# Patient Record
Sex: FEMALE | Race: WHITE | NOT HISPANIC OR LATINO | Employment: OTHER | URBAN - METROPOLITAN AREA
[De-identification: names, ages, dates, MRNs, and addresses within clinical notes are randomized per-mention and may not be internally consistent; named-entity substitution may affect disease eponyms.]

---

## 2021-05-24 NOTE — PATIENT DISCUSSION
CONJUNCTIVAL NEVUS: PT STATES THAT IT IS INCREASING IN SIZE. SECOND ONE DEVELOPING. REFER TO DR. Latricia Vu IN THE NEXT 3-4 WEEKS FOR CONSULTATION &amp; POSSIBLE BIOPSY.

## 2021-07-01 NOTE — PATIENT DISCUSSION
Documented nevus with photos. 6:00 area OD. Two areas of pigment OS. Increase UV protection. Discussed possible removal with pathology, patient was given the option. Patient states it has been increasing in size, she is interested to proceed with removal of large area of pigment OS. Should she decide not to proceed, return in 6 months for documentation of growth. Patient would like to wait till the end of year for excision of lesion of conjunctiva.  Return in October for pre-op paperwork and eval.

## 2021-07-01 NOTE — PATIENT DISCUSSION
Documented nevus with photos. 6:00 area OD. Two areas of pigment OS. Increase UV protection. Discussed possible removal with pathology, patient was given the option. Patient states it has been increasing in size, she is interested to proceed with removal of large area of pigment OS.

## 2021-07-01 NOTE — PATIENT DISCUSSION
Patient would like to wait till the end of year for excision of lesion of conjunctiva.  Return in October for pre-op paperwork and eval.

## 2021-10-14 NOTE — PATIENT DISCUSSION
Increase UV protection. Discussed possible removal with pathology, patient was given the option. Patient states it has been increasing in size, she is interested to proceed with removal of large area of pigment OS. She would like to wait until after the first of the year.

## 2021-10-14 NOTE — PATIENT DISCUSSION
Documented nevus with photos at previous visit, not saved in chart. Unable to find original images. Redocumented today for baseline images.

## 2021-12-09 NOTE — PATIENT DISCUSSION
3 sutures removed today, lotemax SM sample provided to patient to use two times a day for the next week in OS.

## 2021-12-09 NOTE — PROCEDURE NOTE: CLINICAL
PROCEDURE NOTE: Suture Removal #3 OS. Diagnosis: Ocular Melanosis. Anesthesia: Topical. Prep: Antibiotic Drops. Risks, Benefits and Alternatives discussed, patient gives verbal consent to proceed. The patient wished to proceed. A time out to confirm the patient, site, and procedure has been achieved. The site has been marked. A 30G needle or 15 degree blade and jewelers forceps were then used to cut and remove the suture. Patient tolerated the procedure well. Jenae Ruiz

## 2022-02-04 ENCOUNTER — COMPREHENSIVE EXAM (OUTPATIENT)
Dept: URBAN - METROPOLITAN AREA CLINIC 36 | Facility: CLINIC | Age: 67
End: 2022-02-04

## 2022-02-04 DIAGNOSIS — E11.9: ICD-10-CM

## 2022-02-04 DIAGNOSIS — H52.7: ICD-10-CM

## 2022-02-04 DIAGNOSIS — H25.812: ICD-10-CM

## 2022-02-04 DIAGNOSIS — H04.123: ICD-10-CM

## 2022-02-04 DIAGNOSIS — H25.811: ICD-10-CM

## 2022-02-04 PROCEDURE — 92015 DETERMINE REFRACTIVE STATE: CPT

## 2022-02-04 PROCEDURE — 92004 COMPRE OPH EXAM NEW PT 1/>: CPT

## 2022-02-04 ASSESSMENT — VISUAL ACUITY
OD_SC: J3
OS_CC: J3
OD_CC: 20/25
OD_SC: 20/80
OS_CC: 20/60
OD_CC: J4
OS_SC: J>12
OS_SC: 20/50-2
OS_PH: 20/25

## 2022-02-04 ASSESSMENT — TONOMETRY
OD_IOP_MMHG: 15
OS_IOP_MMHG: 16

## 2022-07-14 NOTE — PATIENT DISCUSSION
Patient requests new refraction that was completed today. Was equal to the same rx given by Dr. Elisabet Agrawal.

## 2022-07-14 NOTE — PATIENT DISCUSSION
Increase blink rate: Decreased Blink Rate. Encouraged pt to be more aware of blinking while awake and increase to 8-10 blinks per minute.

## 2022-11-09 ENCOUNTER — EMERGENCY VISIT (OUTPATIENT)
Dept: URBAN - METROPOLITAN AREA CLINIC 36 | Facility: CLINIC | Age: 67
End: 2022-11-09

## 2022-11-09 DIAGNOSIS — H57.11: ICD-10-CM

## 2022-11-09 DIAGNOSIS — H00.022: ICD-10-CM

## 2022-11-09 PROCEDURE — 92012 INTRM OPH EXAM EST PATIENT: CPT

## 2022-11-09 RX ORDER — CEPHALEXIN 500 MG/1: 1 CAPSULE ORAL TWICE A DAY

## 2022-11-09 ASSESSMENT — VISUAL ACUITY
OS_CC: 20/20
OD_CC: 20/25

## 2022-11-09 ASSESSMENT — TONOMETRY
OD_IOP_MMHG: 15
OS_IOP_MMHG: 15

## 2023-01-18 ENCOUNTER — COMPREHENSIVE EXAM (OUTPATIENT)
Dept: URBAN - METROPOLITAN AREA CLINIC 36 | Facility: CLINIC | Age: 68
End: 2023-01-18

## 2023-01-18 DIAGNOSIS — H02.831: ICD-10-CM

## 2023-01-18 DIAGNOSIS — H25.813: ICD-10-CM

## 2023-01-18 DIAGNOSIS — H52.7: ICD-10-CM

## 2023-01-18 DIAGNOSIS — E11.9: ICD-10-CM

## 2023-01-18 DIAGNOSIS — H04.123: ICD-10-CM

## 2023-01-18 DIAGNOSIS — H02.834: ICD-10-CM

## 2023-01-18 PROCEDURE — 92014 COMPRE OPH EXAM EST PT 1/>: CPT

## 2023-01-18 PROCEDURE — 92015 DETERMINE REFRACTIVE STATE: CPT

## 2023-01-18 ASSESSMENT — VISUAL ACUITY
OS_CC: J1
OS_SC: 20/40-1
OS_CC: 20/20
OD_CC: J2
OD_CC: 20/20
OD_SC: J1
OS_SC: J10
OD_SC: 20/70-1

## 2023-01-18 ASSESSMENT — TONOMETRY
OS_IOP_MMHG: 18
OD_IOP_MMHG: 16

## 2023-07-24 ENCOUNTER — ESTABLISHED PATIENT (OUTPATIENT)
Dept: URBAN - METROPOLITAN AREA CLINIC 44 | Facility: CLINIC | Age: 68
End: 2023-07-24

## 2023-07-24 VITALS — WEIGHT: 185 LBS | BODY MASS INDEX: 29.03 KG/M2 | HEIGHT: 67 IN

## 2023-07-24 DIAGNOSIS — H02.834: ICD-10-CM

## 2023-07-24 DIAGNOSIS — H02.835: ICD-10-CM

## 2023-07-24 DIAGNOSIS — H04.123: ICD-10-CM

## 2023-07-24 DIAGNOSIS — H02.832: ICD-10-CM

## 2023-07-24 DIAGNOSIS — L98.8: ICD-10-CM

## 2023-07-24 DIAGNOSIS — H02.831: ICD-10-CM

## 2023-07-24 PROCEDURE — 92285 EXTERNAL OCULAR PHOTOGRAPHY: CPT

## 2023-07-24 PROCEDURE — 99213 OFFICE O/P EST LOW 20 MIN: CPT

## 2023-07-24 ASSESSMENT — VISUAL ACUITY
OD_CC: 20/20
OS_CC: 20/20

## 2023-09-29 ENCOUNTER — TECH ONLY (OUTPATIENT)
Dept: URBAN - METROPOLITAN AREA CLINIC 36 | Facility: CLINIC | Age: 68
End: 2023-09-29

## 2023-09-29 DIAGNOSIS — H02.831: ICD-10-CM

## 2023-09-29 DIAGNOSIS — H02.834: ICD-10-CM

## 2023-09-29 DIAGNOSIS — H02.832: ICD-10-CM

## 2023-09-29 DIAGNOSIS — H02.835: ICD-10-CM

## 2023-09-29 DIAGNOSIS — H04.123: ICD-10-CM

## 2023-09-29 DIAGNOSIS — L98.8: ICD-10-CM

## 2023-09-29 PROCEDURE — 92081 LIMITED VISUAL FIELD XM: CPT

## 2023-09-29 PROCEDURE — 99211T TECH SERVICE

## 2024-01-18 ENCOUNTER — COMPREHENSIVE EXAM (OUTPATIENT)
Dept: URBAN - METROPOLITAN AREA CLINIC 36 | Facility: CLINIC | Age: 69
End: 2024-01-18

## 2024-01-18 DIAGNOSIS — H02.835: ICD-10-CM

## 2024-01-18 DIAGNOSIS — H02.834: ICD-10-CM

## 2024-01-18 DIAGNOSIS — H04.123: ICD-10-CM

## 2024-01-18 DIAGNOSIS — E11.9: ICD-10-CM

## 2024-01-18 DIAGNOSIS — H52.7: ICD-10-CM

## 2024-01-18 DIAGNOSIS — H25.813: ICD-10-CM

## 2024-01-18 DIAGNOSIS — H02.832: ICD-10-CM

## 2024-01-18 DIAGNOSIS — H02.831: ICD-10-CM

## 2024-01-18 PROCEDURE — 92014 COMPRE OPH EXAM EST PT 1/>: CPT

## 2024-01-18 PROCEDURE — 92015 DETERMINE REFRACTIVE STATE: CPT

## 2024-01-18 ASSESSMENT — VISUAL ACUITY
OD_SC: J2
OD_SC: 20/50-2
OS_CC: 20/20
OD_CC: 20/20-2
OS_CC: J1
OS_SC: J12
OD_CC: J4
OS_SC: 20/30

## 2024-01-18 ASSESSMENT — TONOMETRY
OD_IOP_MMHG: 14
OS_IOP_MMHG: 14